# Patient Record
Sex: FEMALE | Race: WHITE | NOT HISPANIC OR LATINO | Employment: FULL TIME | URBAN - METROPOLITAN AREA
[De-identification: names, ages, dates, MRNs, and addresses within clinical notes are randomized per-mention and may not be internally consistent; named-entity substitution may affect disease eponyms.]

---

## 2017-02-07 ENCOUNTER — OFFICE VISIT (OUTPATIENT)
Dept: CARDIOLOGY | Facility: MEDICAL CENTER | Age: 57
End: 2017-02-07
Payer: COMMERCIAL

## 2017-02-07 VITALS
SYSTOLIC BLOOD PRESSURE: 112 MMHG | DIASTOLIC BLOOD PRESSURE: 84 MMHG | BODY MASS INDEX: 20.99 KG/M2 | HEART RATE: 60 BPM | WEIGHT: 126 LBS | HEIGHT: 65 IN

## 2017-02-07 DIAGNOSIS — R55 PRE-SYNCOPE: ICD-10-CM

## 2017-02-07 DIAGNOSIS — I49.1 ECTOPIC ATRIAL RHYTHM: ICD-10-CM

## 2017-02-07 DIAGNOSIS — R01.1 UNDIAGNOSED CARDIAC MURMURS: ICD-10-CM

## 2017-02-07 DIAGNOSIS — I49.3 PVC'S (PREMATURE VENTRICULAR CONTRACTIONS): ICD-10-CM

## 2017-02-07 DIAGNOSIS — R42 LIGHTHEADEDNESS: ICD-10-CM

## 2017-02-07 PROCEDURE — 99214 OFFICE O/P EST MOD 30 MIN: CPT | Performed by: INTERNAL MEDICINE

## 2017-02-08 NOTE — PROGRESS NOTES
"Subjective:   Terra Patricia is a 56 y.o. female who presents today for initial follow-up regarding an episode of presyncope and palpitations. She is an avid long distance runner and has been for many years who in October was jogging when she experienced the sudden onset of palpitations and weakness associated with vertigo. She felt as if she \"melted\" and those able to resume standing and walk she did not finish her run. This has never happened to her before or since. She has no pertinent past medical history or toxic habits. She is postmenopausal. She did not lose consciousness fully. Since that time she has been refraining from exercising until she was scheduled for stress test which has returned completely normal with a exercise capacity that is superior. Echocardiogram is unremarkable. Holter monitor shows rare PVCs that are asymptomatic, 4 beats of an nonsustained atrial tachycardia and episodes of what appear to be symptomatic ectopic atrial rhythm with a normal heart rate.    Since her last visit she is returned to full activity including running 10 miles which is what precipitated her previous event. She been feeling exceedingly well until about 2 weeks ago when she developed an episode of presyncope where she felt lightheaded needed steady herself. This was well seated typing and computer otherwise she has felt well. She did not check her heart rhythm or rate at that time.    Denies any other cardiovascular symptoms including chest pain, shortness of breath, dyspnea on exertion, lightheadedness, syncope or presyncope, lower extremity edema, PND, orthopnea or palpitations.       No past medical history on file.  Past Surgical History   Procedure Laterality Date   • Breast biopsy  1997 - 2000     multiple biopsies left breast     Family History   Problem Relation Age of Onset   • Heart Attack Mother    • Stroke Mother      History   Smoking status   • Never Smoker    Smokeless tobacco   • Never Used     No Known " "Allergies  No outpatient encounter prescriptions on file as of 2/7/2017.     No facility-administered encounter medications on file as of 2/7/2017.     Review of Systems   All other systems reviewed and are negative.       Objective:   /84 mmHg  Pulse 60  Ht 1.651 m (5' 5\")  Wt 57.153 kg (126 lb)  BMI 20.97 kg/m2    Physical Exam   Constitutional: She is oriented to person, place, and time. She appears well-developed and well-nourished. No distress.   Very thin and athletic  Appears much younger than stated age   HENT:   Head: Normocephalic and atraumatic.   Mouth/Throat: Oropharynx is clear and moist. No oropharyngeal exudate.   Eyes: Conjunctivae are normal. Pupils are equal, round, and reactive to light. No scleral icterus.   Neck: Normal range of motion. Neck supple. No JVD present. No thyromegaly present.   Cardiovascular: Normal rate, regular rhythm and intact distal pulses.  Exam reveals no gallop and no friction rub.    Murmur (2/6 systolic murmur at the right upper sternal border) heard.  Pulses:       Carotid pulses are 2+ on the right side, and 2+ on the left side.       Radial pulses are 2+ on the right side, and 2+ on the left side.        Popliteal pulses are 2+ on the right side, and 2+ on the left side.        Dorsalis pedis pulses are 2+ on the right side, and 2+ on the left side.        Posterior tibial pulses are 2+ on the right side, and 2+ on the left side.   Pulmonary/Chest: Effort normal and breath sounds normal. She has no wheezes. She has no rales.   Prominent breasts   Abdominal: Soft. Bowel sounds are normal. She exhibits no distension. There is no tenderness.   Musculoskeletal: She exhibits no edema or tenderness.   Neurological: She is alert and oriented to person, place, and time. No cranial nerve deficit.   Skin: Skin is warm and dry. No rash noted. She is not diaphoretic. No erythema.   Psychiatric: She has a normal mood and affect. Her behavior is normal.   Vitals " reviewed.    EKG (10/10/2016):  I have personally reviewed the EKG this visit and discussed with the patient. It shows normal sinus rhythm. NORMAL ECG.    ECHO CONCLUSIONS (10/20/2016):  No prior study is available for comparison.   Normal left ventricular systolic function.  Left ventricular ejection fraction is visually estimated to be 65%.  Normal diastolic function.  Mild tricuspid regurgitation.  Estimated right ventricular systolic pressure is 25 mmHg.    STRESS (10/10/2016):  I have personally reviewed the stress test myself today. It shows:  Sinus rhythm. Rare PVCs. Excellent functional capacity 12.8 METs. Negative ECG for ischemia    HOLTER SUMMARY (10/24/2016):  Sinus rhythm with occasional and one episode of PAT of 4 beats. Symptoms only with what appears to be an ectopic atrial rhythm with normal heart rate occasionally.      Assessment:     1. Pre-syncope  HOLTER MONITOR / EVENT RECORDER    ECHOCARDIOGRAM COMP W/O CONT   2. PVC's (premature ventricular contractions)     3. Ectopic atrial rhythm     4. Lightheadedness     5. Undiagnosed cardiac murmurs  HOLTER MONITOR / EVENT RECORDER       Medical Decision Making:  Today's Assessment / Status / Plan:     She has not had a syncopal event although does describe a very near syncopal event. This was exertion related which is of course of more concern. She experienced palpitations during this. She has subsequently splinted her activities but on recent exercise treadmill stress testing had a very normal response with excellent exercise capacity. She has had a very normal echo and Holter monitor aside from some symptomatic ectopic atrial rhythm with normal heart rate for which there is no specific treatment and is not cause for concern. She has however developed a recurrence while seated this time, and therefore should require longer term monitoring. We will do a 21 day monitor. Also I do hear a murmur which was not present on prior evaluation. It does not  sound characteristic of mitral regurgitation and requires further investigation as well.    1. Zio patch  2. Echocardiogram for new murmur    Thank you for this interesting consultation. It was my pleasure to see Terra Patricia today.

## 2017-02-21 ENCOUNTER — TELEPHONE (OUTPATIENT)
Dept: CARDIOLOGY | Facility: MEDICAL CENTER | Age: 57
End: 2017-02-21

## 2017-02-21 ENCOUNTER — NON-PROVIDER VISIT (OUTPATIENT)
Dept: CARDIOLOGY | Facility: MEDICAL CENTER | Age: 57
End: 2017-02-21
Attending: INTERNAL MEDICINE
Payer: COMMERCIAL

## 2017-02-21 DIAGNOSIS — I47.10 SVT (SUPRAVENTRICULAR TACHYCARDIA): ICD-10-CM

## 2017-02-21 DIAGNOSIS — R55 PRE-SYNCOPE: ICD-10-CM

## 2017-02-21 DIAGNOSIS — I49.3 PVC'S (PREMATURE VENTRICULAR CONTRACTIONS): ICD-10-CM

## 2017-02-21 DIAGNOSIS — R01.1 UNDIAGNOSED CARDIAC MURMURS: ICD-10-CM

## 2017-03-09 PROCEDURE — 0296T PR EXT ECG > 48HR TO 21 DAY RCRD W/CONECT INTL RCRD: CPT | Performed by: INTERNAL MEDICINE

## 2017-03-09 PROCEDURE — 0298T PR EXT ECG > 48HR TO 21 DAY REVIEW AND INTERPRETATN: CPT | Performed by: INTERNAL MEDICINE

## 2017-03-13 ENCOUNTER — TELEPHONE (OUTPATIENT)
Dept: CARDIOLOGY | Facility: MEDICAL CENTER | Age: 57
End: 2017-03-13

## 2017-03-14 NOTE — TELEPHONE ENCOUNTER
Dr. López reviewed patients recent Zio Patch.   Final interpretation    SR with good heart rate response.   # 2.   Occasional premature ventricular complexes,  Symptomatic.   # 3.   Rare, short SVT's no symptoms.   Called and left message X2 for patient to call back for above results.   Patient is scheduled for and echo on 3/20/17.  mark     Patient calls back, results of Holter monitor given.  She does have her echo scheduled and will call after echo for results.  govind

## 2017-03-20 ENCOUNTER — HOSPITAL ENCOUNTER (OUTPATIENT)
Dept: CARDIOLOGY | Facility: MEDICAL CENTER | Age: 57
End: 2017-03-20
Attending: INTERNAL MEDICINE
Payer: COMMERCIAL

## 2017-03-20 DIAGNOSIS — R55 PRE-SYNCOPE: ICD-10-CM

## 2017-03-20 PROCEDURE — 93306 TTE W/DOPPLER COMPLETE: CPT | Mod: 26 | Performed by: INTERNAL MEDICINE

## 2017-03-20 PROCEDURE — 93306 TTE W/DOPPLER COMPLETE: CPT

## 2017-03-22 LAB — LV EJECT FRACT  99904: 65

## 2017-03-27 ENCOUNTER — TELEPHONE (OUTPATIENT)
Dept: CARDIOLOGY | Facility: MEDICAL CENTER | Age: 57
End: 2017-03-27

## 2017-03-27 NOTE — TELEPHONE ENCOUNTER
----- Message from Chantal Robles sent at 3/27/2017 10:35 AM PDT -----  Regarding: Patient calling for test results  GERONIMO/Dawna    Patient is calling to get her test results and can be reached at [phone].

## 2017-03-27 NOTE — TELEPHONE ENCOUNTER
To Raad Bolanos to follow up with patient 3/28/17.  Echo 2/7/17.  Pt does not have a follow up appointment scheduled

## 2017-04-07 ENCOUNTER — OFFICE VISIT (OUTPATIENT)
Dept: CARDIOLOGY | Facility: MEDICAL CENTER | Age: 57
End: 2017-04-07
Payer: COMMERCIAL

## 2017-04-07 VITALS
OXYGEN SATURATION: 96 % | DIASTOLIC BLOOD PRESSURE: 82 MMHG | BODY MASS INDEX: 20.99 KG/M2 | SYSTOLIC BLOOD PRESSURE: 122 MMHG | HEART RATE: 60 BPM | HEIGHT: 65 IN | WEIGHT: 126 LBS

## 2017-04-07 DIAGNOSIS — I49.1 ECTOPIC ATRIAL RHYTHM: ICD-10-CM

## 2017-04-07 DIAGNOSIS — R00.2 PALPITATIONS: ICD-10-CM

## 2017-04-07 DIAGNOSIS — I49.3 PVC'S (PREMATURE VENTRICULAR CONTRACTIONS): ICD-10-CM

## 2017-04-07 PROCEDURE — 99213 OFFICE O/P EST LOW 20 MIN: CPT | Performed by: INTERNAL MEDICINE

## 2017-04-07 NOTE — PROGRESS NOTES
"Subjective:   Terra Patricia is a 57  y.o. female who presents today for follow-up regarding an episode of presyncope and palpitations. She is an avid long distance runner and has been for many years who in October was jogging when she experienced the sudden onset of palpitations and weakness associated with vertigo. She felt as if she \"melted\" and those able to resume standing and walk she did not finish her run. This has never happened to her before or since. She has no pertinent past medical history or toxic habits. She is postmenopausal. She did not lose consciousness fully. Since that time she has been refraining from exercising until she was scheduled for stress test which has returned completely normal with a exercise capacity that is superior. Echocardiogram is unremarkable. Holter monitor shows rare PVCs that are asymptomatic, 4 beats of an nonsustained atrial tachycardia and episodes of what appear to be symptomatic ectopic atrial rhythm with a normal heart rate.    She is returned to full activity including running 10 miles which is what precipitated her previous event. She continues to have intermittent symptomatic PVCs concur confirmed on Zio patch monitoring as well as a brief episode of nonsustained SVT that was asymptomatic.    Denies any other cardiovascular symptoms including chest pain, shortness of breath, dyspnea on exertion, lightheadedness, syncope or presyncope, lower extremity edema, PND, orthopnea or palpitations.       History reviewed. No pertinent past medical history.  Past Surgical History   Procedure Laterality Date   • Breast biopsy  1997 - 2000     multiple biopsies left breast     Family History   Problem Relation Age of Onset   • Heart Attack Mother    • Stroke Mother      History   Smoking status   • Never Smoker    Smokeless tobacco   • Never Used     No Known Allergies  No outpatient encounter prescriptions on file as of 4/7/2017.     No facility-administered encounter medications " "on file as of 4/7/2017.     Review of Systems   All other systems reviewed and are negative.       Objective:   /82 mmHg  Pulse 60  Ht 1.651 m (5' 5\")  Wt 57.153 kg (126 lb)  BMI 20.97 kg/m2  SpO2 96%    Physical Exam   Constitutional: She is oriented to person, place, and time. She appears well-developed and well-nourished. No distress.   Very thin and athletic  Appears much younger than stated age   HENT:   Head: Normocephalic and atraumatic.   Mouth/Throat: Oropharynx is clear and moist. No oropharyngeal exudate.   Eyes: Conjunctivae are normal. Pupils are equal, round, and reactive to light. No scleral icterus.   Neck: Normal range of motion. Neck supple. No JVD present. No thyromegaly present.   Cardiovascular: Normal rate, regular rhythm and intact distal pulses.  Exam reveals no gallop and no friction rub.    Murmur (2/6 systolic murmur at the right upper sternal border) heard.  Pulses:       Carotid pulses are 2+ on the right side, and 2+ on the left side.       Radial pulses are 2+ on the right side, and 2+ on the left side.        Popliteal pulses are 2+ on the right side, and 2+ on the left side.        Dorsalis pedis pulses are 2+ on the right side, and 2+ on the left side.        Posterior tibial pulses are 2+ on the right side, and 2+ on the left side.   Pulmonary/Chest: Effort normal and breath sounds normal. She has no wheezes. She has no rales.   Prominent breasts   Abdominal: Soft. Bowel sounds are normal. She exhibits no distension. There is no tenderness.   Musculoskeletal: She exhibits no edema or tenderness.   Neurological: She is alert and oriented to person, place, and time. No cranial nerve deficit.   Skin: Skin is warm and dry. No rash noted. She is not diaphoretic. No erythema.   Psychiatric: She has a normal mood and affect. Her behavior is normal.   Vitals reviewed.      HOLTER SUMMARY (2/21/2017):  Symptomatic PVCs, sinus rhythm, brief PSVT    EKG (10/10/2016):  I have " personally reviewed the EKG this visit and discussed with the patient. It shows normal sinus rhythm. NORMAL ECG.    ECHO CONCLUSIONS (10/20/2016):  No prior study is available for comparison.   Normal left ventricular systolic function.  Left ventricular ejection fraction is visually estimated to be 65%.  Normal diastolic function.  Mild tricuspid regurgitation.  Estimated right ventricular systolic pressure is 25 mmHg.    STRESS (10/10/2016):  I have personally reviewed the stress test myself today. It shows:  Sinus rhythm. Rare PVCs. Excellent functional capacity 12.8 METs. Negative ECG for ischemia    HOLTER SUMMARY (10/24/2016):  Sinus rhythm with occasional and one episode of PAT of 4 beats. Symptoms only with what appears to be an ectopic atrial rhythm with normal heart rate occasionally.      Assessment:     1. Palpitations     2. PVC's (premature ventricular contractions)     3. Ectopic atrial rhythm         Medical Decision Making:  Today's Assessment / Status / Plan:     She has not had a syncopal event although does describe a very near syncopal event. This was exertion related which is of course of more concern. She experienced palpitations during this. Subsequently she returned for activity has not had any other exertion related symptoms but does have intermittent palpitations. We discussed these things at length. We discussed suppressive medication given her resting bradycardia however due to being in excellent shape, we decided against at this time. Repeat echo done for her right upper sternal border murmur showed mild TR only.    1. Reassurance  2. Consider low-dose beta blocker or CCB for worsening symptoms of palpitations  3. If that would be intolerable, EP referral

## 2017-04-07 NOTE — MR AVS SNAPSHOT
"        Terra Patricia   2017 1:00 PM   Office Visit   MRN: [id number]    Department:  [facility]   Dept Phone:  [phone]    Description:  Female : 1960   Provider:  Gene López M.D.           Reason for Visit     Follow-Up           Allergies as of 2017     No Known Allergies      You were diagnosed with     Palpitations   [785.1.ICD-9-CM]       PVC's (premature ventricular contractions)   [092520]       Ectopic atrial rhythm   [941084]         Vital Signs     Blood Pressure Pulse Height Weight Body Mass Index Oxygen Saturation    122/82 mmHg 60 1.651 m (5' 5\") 57.153 kg (126 lb) 20.97 kg/m2 96%    Smoking Status                   Never Smoker            Basic Information     Date Of Birth Sex Race Ethnicity Preferred Language    1960 Female White Non- English      Problem List              ICD-10-CM Priority Class Noted - Resolved    PVC's (premature ventricular contractions) I49.3   2016 - Present    Ectopic atrial rhythm I49.1   2016 - Present    Palpitations R00.2   2016 - Present    Lightheadedness R42   2016 - Present      Health Maintenance        Date Due Completion Dates    IMM DTaP/Tdap/Td Vaccine (1 - Tdap) 3/3/1981 ---    PAP SMEAR 3/3/1983 ---    COLONOSCOPY 3/3/2012 ---    MAMMOGRAM 2017, 10/17/2014, 6/10/2011, 2008, 2008, 2007, 2007            Current Immunizations     No immunizations on file.      Below and/or attached are the medications your provider expects you to take. Review all of your home medications and newly ordered medications with your provider and/or pharmacist. Follow medication instructions as directed by your provider and/or pharmacist. Please keep your medication list with you and share with your provider. Update the information when medications are discontinued, doses are changed, or new medications (including over-the-counter products) are added; and carry medication information at " all times in the event of emergency situations     Allergies:  No Known Allergies          Medications  Valid as of: April 07, 2017 -  3:30 PM    Generic Name Brand Name Tablet Size Instructions for use    .                 Medicines prescribed today were sent to:     [id number] - [place] - [address]    [address] [place] [address]    Phone: [phone] Fax: [phone]    Open 24 Hours?: No      Medication refill instructions:       If your prescription bottle indicates you have medication refills left, it is not necessary to call your provider’s office. Please contact your pharmacy and they will refill your medication.    If your prescription bottle indicates you do not have any refills left, you may request refills at any time through one of the following ways: The online MRO system (except Urgent Care), by calling your provider’s office, or by asking your pharmacy to contact your provider’s office with a refill request. Medication refills are processed only during regular business hours and may not be available until the next business day. Your provider may request additional information or to have a follow-up visit with you prior to refilling your medication.   *Please Note: Medication refills are assigned a new Rx number when refilled electronically. Your pharmacy may indicate that no refills were authorized even though a new prescription for the same medication is available at the pharmacy. Please request the medicine by name with the pharmacy before contacting your provider for a refill.           MRO Access Code: Activation code not generated  Current MRO Status: Active

## 2017-08-14 ENCOUNTER — HOSPITAL ENCOUNTER (OUTPATIENT)
Dept: RADIOLOGY | Facility: MEDICAL CENTER | Age: 57
End: 2017-08-14
Attending: FAMILY MEDICINE
Payer: COMMERCIAL

## 2017-08-14 DIAGNOSIS — Z12.31 VISIT FOR SCREENING MAMMOGRAM: ICD-10-CM

## 2017-08-14 PROCEDURE — 77063 BREAST TOMOSYNTHESIS BI: CPT

## 2018-08-17 ENCOUNTER — HOSPITAL ENCOUNTER (OUTPATIENT)
Dept: RADIOLOGY | Facility: MEDICAL CENTER | Age: 58
End: 2018-08-17
Attending: FAMILY MEDICINE
Payer: COMMERCIAL

## 2018-08-17 DIAGNOSIS — Z12.31 ENCOUNTER FOR SCREENING MAMMOGRAM FOR MALIGNANT NEOPLASM OF BREAST: ICD-10-CM

## 2018-08-17 PROCEDURE — 77067 SCR MAMMO BI INCL CAD: CPT

## 2020-02-04 ENCOUNTER — HOSPITAL ENCOUNTER (OUTPATIENT)
Dept: RADIOLOGY | Facility: MEDICAL CENTER | Age: 60
End: 2020-02-04
Attending: FAMILY MEDICINE
Payer: COMMERCIAL

## 2020-02-04 DIAGNOSIS — Z12.31 VISIT FOR SCREENING MAMMOGRAM: ICD-10-CM

## 2020-02-04 PROCEDURE — 77067 SCR MAMMO BI INCL CAD: CPT

## 2020-11-30 ENCOUNTER — TELEPHONE (OUTPATIENT)
Dept: CARDIOLOGY | Facility: MEDICAL CENTER | Age: 60
End: 2020-11-30

## 2020-11-30 NOTE — TELEPHONE ENCOUNTER
Called patient on [phone] wondering if she has seen any recent cardiologist since her last visit with us back in 2017. Pt claims she has not seen any other cardiologist.   Pt also says all of her lab work is done at [facility] so I am requesting records now.

## 2021-11-08 ENCOUNTER — HOSPITAL ENCOUNTER (OUTPATIENT)
Dept: RADIOLOGY | Facility: MEDICAL CENTER | Age: 61
End: 2021-11-08
Attending: FAMILY MEDICINE
Payer: COMMERCIAL

## 2021-11-08 DIAGNOSIS — Z12.31 VISIT FOR SCREENING MAMMOGRAM: ICD-10-CM

## 2021-11-08 PROCEDURE — 77063 BREAST TOMOSYNTHESIS BI: CPT

## 2023-07-24 ENCOUNTER — HOSPITAL ENCOUNTER (OUTPATIENT)
Dept: RADIOLOGY | Facility: MEDICAL CENTER | Age: 63
End: 2023-07-24
Attending: FAMILY MEDICINE
Payer: COMMERCIAL

## 2023-07-24 DIAGNOSIS — Z12.31 VISIT FOR SCREENING MAMMOGRAM: ICD-10-CM

## 2023-07-24 PROCEDURE — 77063 BREAST TOMOSYNTHESIS BI: CPT

## 2025-08-07 ENCOUNTER — HOSPITAL ENCOUNTER (OUTPATIENT)
Dept: RADIOLOGY | Facility: MEDICAL CENTER | Age: 65
End: 2025-08-07
Attending: FAMILY MEDICINE
Payer: COMMERCIAL

## 2025-08-07 VITALS — HEIGHT: 65 IN | BODY MASS INDEX: 20.83 KG/M2 | WEIGHT: 125 LBS

## 2025-08-07 DIAGNOSIS — Z12.31 ENCOUNTER FOR MAMMOGRAM TO ESTABLISH BASELINE MAMMOGRAM: ICD-10-CM

## 2025-08-07 PROCEDURE — 77067 SCR MAMMO BI INCL CAD: CPT

## 2025-08-14 ENCOUNTER — APPOINTMENT (OUTPATIENT)
Facility: MEDICAL CENTER | Age: 65
End: 2025-08-14
Attending: FAMILY MEDICINE
Payer: COMMERCIAL